# Patient Record
Sex: FEMALE
[De-identification: names, ages, dates, MRNs, and addresses within clinical notes are randomized per-mention and may not be internally consistent; named-entity substitution may affect disease eponyms.]

---

## 2017-06-04 ENCOUNTER — FORM ENCOUNTER (OUTPATIENT)
Age: 67
End: 2017-06-04

## 2018-06-05 ENCOUNTER — FORM ENCOUNTER (OUTPATIENT)
Age: 68
End: 2018-06-05

## 2019-06-25 ENCOUNTER — FORM ENCOUNTER (OUTPATIENT)
Age: 69
End: 2019-06-25

## 2020-06-28 ENCOUNTER — FORM ENCOUNTER (OUTPATIENT)
Age: 70
End: 2020-06-28

## 2021-06-22 PROBLEM — Z00.00 ENCOUNTER FOR PREVENTIVE HEALTH EXAMINATION: Status: ACTIVE | Noted: 2021-06-22

## 2021-06-30 PROBLEM — Z86.69 HISTORY OF HEARING LOSS: Status: RESOLVED | Noted: 2021-06-30 | Resolved: 2021-06-30

## 2021-06-30 RX ORDER — ASPIRIN 81 MG
81 TABLET, DELAYED RELEASE (ENTERIC COATED) ORAL
Refills: 0 | Status: ACTIVE | COMMUNITY

## 2021-06-30 RX ORDER — ESTRADIOL 10 UG/1
INSERT VAGINAL
Refills: 0 | Status: ACTIVE | COMMUNITY

## 2021-06-30 RX ORDER — VITAMIN E 268 MG
500 CAPSULE ORAL
Refills: 0 | Status: ACTIVE | COMMUNITY

## 2021-06-30 RX ORDER — ATORVASTATIN CALCIUM 10 MG/1
10 TABLET, FILM COATED ORAL
Refills: 0 | Status: ACTIVE | COMMUNITY

## 2021-07-07 ENCOUNTER — APPOINTMENT (OUTPATIENT)
Dept: BREAST CENTER | Facility: CLINIC | Age: 71
End: 2021-07-07
Payer: MEDICARE

## 2021-07-07 VITALS
BODY MASS INDEX: 20.46 KG/M2 | OXYGEN SATURATION: 96 % | HEIGHT: 68 IN | HEART RATE: 76 BPM | RESPIRATION RATE: 16 BRPM | SYSTOLIC BLOOD PRESSURE: 128 MMHG | WEIGHT: 135 LBS | DIASTOLIC BLOOD PRESSURE: 74 MMHG

## 2021-07-07 DIAGNOSIS — Z86.69 PERSONAL HISTORY OF OTHER DISEASES OF THE NERVOUS SYSTEM AND SENSE ORGANS: ICD-10-CM

## 2021-07-07 PROCEDURE — 99213 OFFICE O/P EST LOW 20 MIN: CPT

## 2022-07-13 ENCOUNTER — APPOINTMENT (OUTPATIENT)
Dept: BREAST CENTER | Facility: CLINIC | Age: 72
End: 2022-07-13

## 2022-10-05 ENCOUNTER — APPOINTMENT (OUTPATIENT)
Dept: BREAST CENTER | Facility: CLINIC | Age: 72
End: 2022-10-05

## 2022-10-05 VITALS
HEART RATE: 69 BPM | WEIGHT: 145 LBS | DIASTOLIC BLOOD PRESSURE: 85 MMHG | BODY MASS INDEX: 21.98 KG/M2 | SYSTOLIC BLOOD PRESSURE: 129 MMHG | HEIGHT: 68 IN | TEMPERATURE: 97.9 F | OXYGEN SATURATION: 98 %

## 2022-10-05 DIAGNOSIS — Z78.9 OTHER SPECIFIED HEALTH STATUS: ICD-10-CM

## 2022-10-05 PROCEDURE — 99213 OFFICE O/P EST LOW 20 MIN: CPT

## 2023-07-31 ENCOUNTER — APPOINTMENT (OUTPATIENT)
Dept: BREAST CENTER | Facility: CLINIC | Age: 73
End: 2023-07-31
Payer: MEDICARE

## 2023-07-31 ENCOUNTER — NON-APPOINTMENT (OUTPATIENT)
Age: 73
End: 2023-07-31

## 2023-07-31 VITALS
WEIGHT: 141 LBS | DIASTOLIC BLOOD PRESSURE: 76 MMHG | SYSTOLIC BLOOD PRESSURE: 122 MMHG | HEIGHT: 68 IN | HEART RATE: 70 BPM | BODY MASS INDEX: 21.37 KG/M2

## 2023-07-31 DIAGNOSIS — Z85.3 ENCOUNTER FOR FOLLOW-UP EXAMINATION AFTER COMPLETED TREATMENT FOR MALIGNANT NEOPLASM: ICD-10-CM

## 2023-07-31 DIAGNOSIS — Z80.3 FAMILY HISTORY OF MALIGNANT NEOPLASM OF BREAST: ICD-10-CM

## 2023-07-31 DIAGNOSIS — Z78.9 OTHER SPECIFIED HEALTH STATUS: ICD-10-CM

## 2023-07-31 DIAGNOSIS — Z08 ENCOUNTER FOR FOLLOW-UP EXAMINATION AFTER COMPLETED TREATMENT FOR MALIGNANT NEOPLASM: ICD-10-CM

## 2023-07-31 DIAGNOSIS — Z85.3 PERSONAL HISTORY OF MALIGNANT NEOPLASM OF BREAST: ICD-10-CM

## 2023-07-31 PROCEDURE — 99213 OFFICE O/P EST LOW 20 MIN: CPT

## 2023-07-31 RX ORDER — OXYCODONE 5 MG/1
5 TABLET ORAL
Qty: 8 | Refills: 0 | Status: ACTIVE | COMMUNITY
Start: 2023-06-20

## 2023-07-31 NOTE — HISTORY OF PRESENT ILLNESS
[FreeTextEntry1] : Patient is a 74yo F here for breast cancer surveillance. 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 S/p RT and Tamoxifen x 5 years. Positive fhx of breast cancer in maternal grandmother, dx 50's. No known genetics. She denies palpable breast masses or nipple discharge.   TNM Staging: pT1, pN0, M0  6/5/17: B/L MG- TERE 6/6/18: B/L MG & US- scattered fibroglandular densities 6/26/19: B/L MG- no evidence of disease 6/29/20: B/L MG- TERE. 7/7/21: B/L MG- TERE 7/25/22: B/l MG- heterogeneously dense. R stable post lumpx changes 11:30. BI-RADS 2 7/31/23: B/l MG- heterogeneously dense, b/l stable post-surgical changes, TERE. BIRADS 2.

## 2023-08-18 ENCOUNTER — TRANSCRIPTION ENCOUNTER (OUTPATIENT)
Age: 73
End: 2023-08-18

## 2024-07-31 ENCOUNTER — NON-APPOINTMENT (OUTPATIENT)
Age: 74
End: 2024-07-31

## 2024-07-31 NOTE — HISTORY OF PRESENT ILLNESS
[FreeTextEntry1] : Patient is a 75yo F here for breast cancer surveillance. Hx of RIGHT nloc lumpx x2 w/ re-excision and SNLB in 2001 (age 51) for 0.3cm IDC (ER+/[place]+/HER2 2+), DCIS, LCIS, negative margins and 0/2LN. S/p RT and Tamoxifen x 5 years. Positive fhx of breast cancer in maternal grandmother, dx 50's. No known genetics. She denies palpable breast masses or nipple discharge.   TNM Staging: pT1, pN0, M0  6/5/17: B/L MG- TERE 6/6/18: B/L MG & US- scattered fibroglandular densities 6/26/19: B/L MG- no evidence of disease 6/29/20: B/L MG- TERE. 7/7/21: B/L MG- TERE 7/25/22: B/l MG- heterogeneously dense. R stable post lumpx changes 11:30. BI-RADS 2 7/31/23: B/l MG- heterogeneously dense, b/l stable post-surgical changes, TERE. BIRADS 2.  8/14/24: B/l MG- scheduled

## 2024-07-31 NOTE — PAST MEDICAL HISTORY
[Menarche Age ____] : age at menarche was [unfilled] [Menopause Age____] : age at menopause was [unfilled] [History of Hormone Replacement Treatment] : has a history of hormone replacement treatment [Total Preg ___] : G[unfilled] [FreeTextEntry6] : Yes

## 2024-07-31 NOTE — PHYSICAL EXAM
[Normocephalic] : normocephalic [EOMI] : extra ocular movement intact [Supple] : supple [No Supraclavicular Adenopathy] : no supraclavicular adenopathy [No Cervical Adenopathy] : no cervical adenopathy [de-identified] : R breast/axilla/supraclavicular area: No evidence of recurrence\par   [de-identified] : L breast/axilla/supraclavicular area: No masses, discharge, or adenopathy

## 2024-08-09 ENCOUNTER — NON-APPOINTMENT (OUTPATIENT)
Age: 74
End: 2024-08-09

## 2024-08-14 ENCOUNTER — APPOINTMENT (OUTPATIENT)
Dept: BREAST CENTER | Facility: CLINIC | Age: 74
End: 2024-08-14
Payer: MEDICARE

## 2024-08-14 VITALS
HEIGHT: 68 IN | BODY MASS INDEX: 21.07 KG/M2 | WEIGHT: 139 LBS | HEART RATE: 61 BPM | DIASTOLIC BLOOD PRESSURE: 79 MMHG | SYSTOLIC BLOOD PRESSURE: 176 MMHG

## 2024-08-14 DIAGNOSIS — Z08 ENCOUNTER FOR FOLLOW-UP EXAMINATION AFTER COMPLETED TREATMENT FOR MALIGNANT NEOPLASM: ICD-10-CM

## 2024-08-14 DIAGNOSIS — Z85.3 ENCOUNTER FOR FOLLOW-UP EXAMINATION AFTER COMPLETED TREATMENT FOR MALIGNANT NEOPLASM: ICD-10-CM

## 2024-08-14 DIAGNOSIS — R92.8 OTHER ABNORMAL AND INCONCLUSIVE FINDINGS ON DIAGNOSTIC IMAGING OF BREAST: ICD-10-CM

## 2024-08-14 DIAGNOSIS — Z85.3 PERSONAL HISTORY OF MALIGNANT NEOPLASM OF BREAST: ICD-10-CM

## 2024-08-14 PROCEDURE — 99213 OFFICE O/P EST LOW 20 MIN: CPT

## 2024-08-16 PROBLEM — R92.8 ABNORMAL FINDING ON BREAST IMAGING: Status: ACTIVE | Noted: 2024-08-16

## 2024-08-19 NOTE — PHYSICAL EXAM
[de-identified] : R breast/axilla/supraclavicular area: No evidence of recurrence\par   [de-identified] : L breast/axilla/supraclavicular area: No masses, discharge, or adenopathy

## 2024-08-19 NOTE — REVIEW OF SYSTEMS
[Fever] : no fever [Chills] : no chills [Shortness Of Breath] : no shortness of breath [Cough] : no cough [Skin Lesions] : no skin lesions [Skin Wound] : no skin wound No significant past surgical history

## 2024-08-19 NOTE — PHYSICAL EXAM
[de-identified] : R breast/axilla/supraclavicular area: No evidence of recurrence\par   [de-identified] : L breast/axilla/supraclavicular area: No masses, discharge, or adenopathy

## 2024-08-19 NOTE — PHYSICAL EXAM
[de-identified] : R breast/axilla/supraclavicular area: No evidence of recurrence\par   [de-identified] : L breast/axilla/supraclavicular area: No masses, discharge, or adenopathy

## 2024-08-19 NOTE — HISTORY OF PRESENT ILLNESS
[FreeTextEntry1] : Patient is a 73yo F here for breast cancer surveillance. Hx of RIGHT nloc lumpx x2 w/ re-excision and SNLB in 2001 (age 51) for 0.3cm IDC (ER+/[place]+/HER2 2+), DCIS, LCIS, negative margins and 0/2LN. S/p RT and Tamoxifen x 5 years. Positive fhx of breast cancer in maternal grandmother, dx 50's. No known genetics. She denies palpable breast masses or nipple discharge.   TNM Staging: pT1, pN0, M0  6/5/17: B/L MG- TERE 6/6/18: B/L MG & US- scattered fibroglandular densities 6/26/19: B/L MG- no evidence of disease 6/29/20: B/L MG- TERE. 7/7/21: B/L MG- TERE 7/25/22: B/l MG- heterogeneously dense. R stable post lumpx changes 11:30. BI-RADS 2 7/31/23: B/l MG- heterogeneously dense, b/l stable post-surgical changes, TERE. BIRADS 2.  8/14/24: B/ldxMG/US-heterogeneously dense.  R 11-12:00 postsurgical scarring with benign dystrophic calcs marked by scar marker.  B/L stable benign calcs.  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  L 9:00 retroareolar asymmetry, no definite underlying mass or distortion on diagnostic imaging (rec 6-month follow-up L DX MG/US).  US-R 0.6 cm 6:00 3 FN complex hypoechoic mass (rec 6-month R DX US).
[FreeTextEntry1] : Patient is a 73yo F here for breast cancer surveillance. Hx of RIGHT nloc lumpx x2 w/ re-excision and SNLB in 2001 (age 51) for 0.3cm IDC (ER+/[place]+/HER2 2+), DCIS, LCIS, negative margins and 0/2LN. S/p RT and Tamoxifen x 5 years. Positive fhx of breast cancer in maternal grandmother, dx 50's. No known genetics. She denies palpable breast masses or nipple discharge.   TNM Staging: pT1, pN0, M0  6/5/17: B/L MG- TERE 6/6/18: B/L MG & US- scattered fibroglandular densities 6/26/19: B/L MG- no evidence of disease 6/29/20: B/L MG- TERE. 7/7/21: B/L MG- TERE 7/25/22: B/l MG- heterogeneously dense. R stable post lumpx changes 11:30. BI-RADS 2 7/31/23: B/l MG- heterogeneously dense, b/l stable post-surgical changes, TERE. BIRADS 2.  8/14/24: B/ldxMG/US-heterogeneously dense.  R 11-12:00 postsurgical scarring with benign dystrophic calcs marked by scar marker.  B/L stable benign calcs.  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  L 9:00 retroareolar asymmetry, no definite underlying mass or distortion on diagnostic imaging (rec 6-month follow-up L DX MG/US).  US-R 0.6 cm 6:00 3 FN complex hypoechoic mass (rec 6-month R DX US).
[FreeTextEntry1] : Patient is a 73yo F here for breast cancer surveillance. Hx of RIGHT nloc lumpx x2 w/ re-excision and SNLB in 2001 (age 51) for 0.3cm IDC (ER+/[place]+/HER2 2+), DCIS, LCIS, negative margins and 0/2LN. S/p RT and Tamoxifen x 5 years. Positive fhx of breast cancer in maternal grandmother, dx 50's. No known genetics. She denies palpable breast masses or nipple discharge.   TNM Staging: pT1, pN0, M0  6/5/17: B/L MG- TERE 6/6/18: B/L MG & US- scattered fibroglandular densities 6/26/19: B/L MG- no evidence of disease 6/29/20: B/L MG- TERE. 7/7/21: B/L MG- TERE 7/25/22: B/l MG- heterogeneously dense. R stable post lumpx changes 11:30. BI-RADS 2 7/31/23: B/l MG- heterogeneously dense, b/l stable post-surgical changes, TERE. BIRADS 2.  8/14/24: B/ldxMG/US-heterogeneously dense.  R 11-12:00 postsurgical scarring with benign dystrophic calcs marked by scar marker.  B/L stable benign calcs.  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  L 9:00 retroareolar asymmetry, no definite underlying mass or distortion on diagnostic imaging (rec 6-month follow-up L DX MG/US).  US-R 0.6 cm 6:00 3 FN complex hypoechoic mass (rec 6-month R DX US).
[FreeTextEntry1] : Patient is a 75yo F here for breast cancer surveillance. Hx of RIGHT nloc lumpx x2 w/ re-excision and SNLB in 2001 (age 51) for 0.3cm IDC (ER+/[place]+/HER2 2+), DCIS, LCIS, negative margins and 0/2LN. S/p RT and Tamoxifen x 5 years. Positive fhx of breast cancer in maternal grandmother, dx 50's. No known genetics. She denies palpable breast masses or nipple discharge.   TNM Staging: pT1, pN0, M0  6/5/17: B/L MG- TERE 6/6/18: B/L MG & US- scattered fibroglandular densities 6/26/19: B/L MG- no evidence of disease 6/29/20: B/L MG- TERE. 7/7/21: B/L MG- TERE 7/25/22: B/l MG- heterogeneously dense. R stable post lumpx changes 11:30. BI-RADS 2 7/31/23: B/l MG- heterogeneously dense, b/l stable post-surgical changes, TERE. BIRADS 2.  8/14/24: B/ldxMG/US-heterogeneously dense.  R 11-12:00 postsurgical scarring with benign dystrophic calcs marked by scar marker.  B/L stable benign calcs.  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  L 9:00 retroareolar asymmetry, no definite underlying mass or distortion on diagnostic imaging (rec 6-month follow-up L DX MG/US).  US-R 0.6 cm 6:00 3 FN complex hypoechoic mass (rec 6-month R DX US).
[FreeTextEntry1] : Patient is a 75yo F here for breast cancer surveillance. Hx of RIGHT nloc lumpx x2 w/ re-excision and SNLB in 2001 (age 51) for 0.3cm IDC (ER+/[place]+/HER2 2+), DCIS, LCIS, negative margins and 0/2LN. S/p RT and Tamoxifen x 5 years. Positive fhx of breast cancer in maternal grandmother, dx 50's. No known genetics. She denies palpable breast masses or nipple discharge.   TNM Staging: pT1, pN0, M0  6/5/17: B/L MG- TERE 6/6/18: B/L MG & US- scattered fibroglandular densities 6/26/19: B/L MG- no evidence of disease 6/29/20: B/L MG- TERE. 7/7/21: B/L MG- TERE 7/25/22: B/l MG- heterogeneously dense. R stable post lumpx changes 11:30. BI-RADS 2 7/31/23: B/l MG- heterogeneously dense, b/l stable post-surgical changes, TERE. BIRADS 2.  8/14/24: B/ldxMG/US-heterogeneously dense.  R 11-12:00 postsurgical scarring with benign dystrophic calcs marked by scar marker.  B/L stable benign calcs.  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  L 9:00 retroareolar asymmetry, no definite underlying mass or distortion on diagnostic imaging (rec 6-month follow-up L DX MG/US).  US-R 0.6 cm 6:00 3 FN complex hypoechoic mass (rec 6-month R DX US).
WDL

## 2024-08-19 NOTE — PHYSICAL EXAM
[de-identified] : R breast/axilla/supraclavicular area: No evidence of recurrence\par   [de-identified] : L breast/axilla/supraclavicular area: No masses, discharge, or adenopathy

## 2024-08-19 NOTE — PHYSICAL EXAM
[de-identified] : R breast/axilla/supraclavicular area: No evidence of recurrence\par   [de-identified] : L breast/axilla/supraclavicular area: No masses, discharge, or adenopathy

## 2025-02-28 ENCOUNTER — NON-APPOINTMENT (OUTPATIENT)
Age: 75
End: 2025-02-28

## 2025-07-31 ENCOUNTER — NON-APPOINTMENT (OUTPATIENT)
Age: 75
End: 2025-07-31

## 2025-08-20 ENCOUNTER — NON-APPOINTMENT (OUTPATIENT)
Age: 75
End: 2025-08-20

## 2025-08-20 ENCOUNTER — APPOINTMENT (OUTPATIENT)
Dept: BREAST CENTER | Facility: CLINIC | Age: 75
End: 2025-08-20
Payer: MEDICARE

## 2025-08-20 VITALS
WEIGHT: 141 LBS | BODY MASS INDEX: 21.37 KG/M2 | HEIGHT: 68 IN | DIASTOLIC BLOOD PRESSURE: 85 MMHG | HEART RATE: 72 BPM | SYSTOLIC BLOOD PRESSURE: 151 MMHG

## 2025-08-20 DIAGNOSIS — Z08 ENCOUNTER FOR FOLLOW-UP EXAMINATION AFTER COMPLETED TREATMENT FOR MALIGNANT NEOPLASM: ICD-10-CM

## 2025-08-20 DIAGNOSIS — Z85.3 ENCOUNTER FOR FOLLOW-UP EXAMINATION AFTER COMPLETED TREATMENT FOR MALIGNANT NEOPLASM: ICD-10-CM

## 2025-08-20 DIAGNOSIS — R92.8 OTHER ABNORMAL AND INCONCLUSIVE FINDINGS ON DIAGNOSTIC IMAGING OF BREAST: ICD-10-CM

## 2025-08-20 DIAGNOSIS — Z85.3 PERSONAL HISTORY OF MALIGNANT NEOPLASM OF BREAST: ICD-10-CM

## 2025-08-20 PROCEDURE — 99213 OFFICE O/P EST LOW 20 MIN: CPT

## 2025-08-20 RX ORDER — PNV NO.95/FERROUS FUM/FOLIC AC 28MG-0.8MG
TABLET ORAL
Refills: 0 | Status: ACTIVE | COMMUNITY

## 2025-08-20 RX ORDER — PSYLLIUM HUSK 0.4 G
CAPSULE ORAL
Refills: 0 | Status: ACTIVE | COMMUNITY